# Patient Record
Sex: FEMALE | Race: WHITE | NOT HISPANIC OR LATINO | Employment: STUDENT | URBAN - METROPOLITAN AREA
[De-identification: names, ages, dates, MRNs, and addresses within clinical notes are randomized per-mention and may not be internally consistent; named-entity substitution may affect disease eponyms.]

---
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
## 2019-04-03 ENCOUNTER — HOSPITAL ENCOUNTER (EMERGENCY)
Facility: HOSPITAL | Age: 19
Discharge: HOME | End: 2019-04-03
Attending: SURGERY
Payer: COMMERCIAL

## 2019-04-03 VITALS
HEART RATE: 84 BPM | TEMPERATURE: 96 F | OXYGEN SATURATION: 100 % | DIASTOLIC BLOOD PRESSURE: 80 MMHG | SYSTOLIC BLOOD PRESSURE: 133 MMHG | RESPIRATION RATE: 18 BRPM

## 2019-04-03 DIAGNOSIS — M25.571 RIGHT ANKLE PAIN: ICD-10-CM

## 2019-04-03 DIAGNOSIS — S82.891A AVULSION FRACTURE OF ANKLE, RIGHT, CLOSED, INITIAL ENCOUNTER: Primary | ICD-10-CM

## 2019-04-03 DIAGNOSIS — M79.671 RIGHT FOOT PAIN: ICD-10-CM

## 2019-04-03 LAB — B-HCG UR QL: NEGATIVE

## 2019-04-03 PROCEDURE — 25000003 PHARM REV CODE 250: Performed by: NURSE PRACTITIONER

## 2019-04-03 PROCEDURE — 81025 URINE PREGNANCY TEST: CPT

## 2019-04-03 PROCEDURE — 99284 EMERGENCY DEPT VISIT MOD MDM: CPT | Mod: 25

## 2019-04-03 PROCEDURE — 63600175 PHARM REV CODE 636 W HCPCS: Performed by: NURSE PRACTITIONER

## 2019-04-03 PROCEDURE — 29515 APPLICATION SHORT LEG SPLINT: CPT | Mod: RT

## 2019-04-03 PROCEDURE — 96372 THER/PROPH/DIAG INJ SC/IM: CPT | Mod: 59

## 2019-04-03 RX ORDER — TRAMADOL HYDROCHLORIDE 50 MG/1
50 TABLET ORAL EVERY 6 HOURS PRN
Qty: 15 TABLET | Refills: 0 | Status: SHIPPED | OUTPATIENT
Start: 2019-04-03 | End: 2019-05-29

## 2019-04-03 RX ORDER — TRAMADOL HYDROCHLORIDE 50 MG/1
50 TABLET ORAL
Status: COMPLETED | OUTPATIENT
Start: 2019-04-03 | End: 2019-04-03

## 2019-04-03 RX ORDER — IBUPROFEN 800 MG/1
800 TABLET ORAL EVERY 8 HOURS PRN
Qty: 20 TABLET | Refills: 0 | Status: SHIPPED | OUTPATIENT
Start: 2019-04-03 | End: 2019-05-29

## 2019-04-03 RX ORDER — KETOROLAC TROMETHAMINE 30 MG/ML
60 INJECTION, SOLUTION INTRAMUSCULAR; INTRAVENOUS
Status: COMPLETED | OUTPATIENT
Start: 2019-04-03 | End: 2019-04-03

## 2019-04-03 RX ADMIN — TRAMADOL HYDROCHLORIDE 50 MG: 50 TABLET, FILM COATED ORAL at 04:04

## 2019-04-03 RX ADMIN — KETOROLAC TROMETHAMINE 60 MG: 60 INJECTION, SOLUTION INTRAMUSCULAR at 04:04

## 2019-04-03 NOTE — ED PROVIDER NOTES
Encounter Date: 4/3/2019       History     Chief Complaint   Patient presents with    Ankle Pain     Bill Jacobs is a 19 y.o. Female with PMH of Asthma and PCOS who presents to the ED with reports of right ankle pain s/p twisting while walking. Reports aching, throbbing pain that increases with movement rated 8/10 on pain scale. Denies numbness or tingling to right foot or toes. Reports taking ibuprofen for pain PTA.     The history is provided by the patient.     Review of patient's allergies indicates:  No Known Allergies  Past Medical History:   Diagnosis Date    Asthma     History of PCOS      Past Surgical History:   Procedure Laterality Date    HYMENECTOMY (PROCEDURE WAS NOT DONE) N/A 7/20/2018    Per[facility] at [facility]    TONSILLECTOMY       Family History   Problem Relation Age of Onset    Endometriosis Sister     Breast cancer Neg Hx     Colon cancer Neg Hx     Ovarian cancer Neg Hx      Social History     Tobacco Use    Smoking status: Never Smoker    Smokeless tobacco: Never Used   Substance Use Topics    Alcohol use: No    Drug use: No     Review of Systems   Constitutional: Negative.  Negative for activity change, chills and fever.   HENT: Negative.  Negative for congestion, ear discharge, ear pain, postnasal drip, sinus pressure, sinus pain and sore throat.    Eyes: Negative.    Respiratory: Negative.  Negative for cough, chest tightness and shortness of breath.    Cardiovascular: Negative.  Negative for chest pain.   Gastrointestinal: Negative.  Negative for abdominal distention, abdominal pain and nausea.   Endocrine: Negative.    Genitourinary: Negative.  Negative for dysuria, frequency and urgency.   Musculoskeletal: Positive for arthralgias. Negative for back pain.        Right ankle pain status post fall with swelling. Denies numbness or tingling to right foot.   Skin: Negative.  Negative for rash.   Allergic/Immunologic: Negative.    Neurological:  Negative.  Negative for dizziness, weakness, light-headedness and numbness.   Hematological: Negative.  Does not bruise/bleed easily.   Psychiatric/Behavioral: Negative.        Physical Exam     Initial Vitals [04/03/19 1542]   BP Pulse Resp Temp SpO2   136/81 88 18 96.4 °F (35.8 °C) 100 %      MAP       --         Physical Exam    Nursing note and vitals reviewed.  Constitutional: She appears well-developed and well-nourished.   HENT:   Head: Normocephalic and atraumatic.   Right Ear: External ear normal.   Left Ear: External ear normal.   Nose: Nose normal.   Mouth/Throat: Oropharynx is clear and moist.   Eyes: Conjunctivae and EOM are normal. Pupils are equal, round, and reactive to light.   Neck: Normal range of motion. Neck supple.   Cardiovascular: Normal rate, regular rhythm, normal heart sounds and intact distal pulses.   Pulmonary/Chest: Effort normal and breath sounds normal. She has no decreased breath sounds. She has no wheezes. She has no rhonchi. She has no rales.   Abdominal: Soft. Bowel sounds are normal. There is no tenderness.   Musculoskeletal:        Right ankle: She exhibits decreased range of motion and swelling. She exhibits no ecchymosis, no deformity, no laceration and normal pulse. Tenderness. Lateral malleolus and medial malleolus tenderness found. Achilles tendon normal.   Neurological: She is alert and oriented to person, place, and time. She has normal strength. She displays normal reflexes. No cranial nerve deficit or sensory deficit.   Skin: Skin is warm and dry. Capillary refill takes less than 2 seconds. No rash noted.   Psychiatric: She has a normal mood and affect. Her behavior is normal. Judgment and thought content normal.         ED Course   Splint Application  Date/Time: 4/3/2019 6:52 PM  Performed by: Teresa Ellis NP  Authorized by: Tom Kingston Jr., MD   Consent Done: Not Needed  Location details: right ankle  Splint type: short leg  Supplies used: plaster,  cotton  padding and elastic bandage  Post-procedure: The splinted body part was neurovascularly unchanged following the procedure.  Patient tolerance: Patient tolerated the procedure well with no immediate complications        Labs Reviewed   PREGNANCY TEST, URINE RAPID          Imaging Results          X-Ray Foot Complete Right (Final result)  Result time 04/03/19 16:08:36    Final result by Andrea Pimentel MD (04/03/19 16:08:36)                 Impression:      No acute fracture or dislocation.      Electronically signed by: Andrea Pimentel MD  Date:    04/03/2019  Time:    16:08             Narrative:    EXAMINATION:  XR FOOT COMPLETE 3 VIEW RIGHT    CLINICAL HISTORY:  XR FOOT COMPLETE 3 VIEW RIGHTPain in right foot    COMPARISON:  None    FINDINGS:  Three views of the right foot were obtained.    No evidence of acute fracture or dislocation.  Bony mineralization is normal.   Soft tissue swelling is seen about the ankle.  Small joint effusion suspected.                               X-Ray Ankle Complete Right (Final result)  Result time 04/03/19 16:07:56    Final result by Andrea Pimentel MD (04/03/19 16:07:56)                 Impression:      Soft tissue swelling is seen about the ankle. Small avulsion injury is suspected at the medial malleolus. Small joint effusion suspected.      Electronically signed by: Andrea Pimentel MD  Date:    04/03/2019  Time:    16:07             Narrative:    EXAMINATION:  XR ANKLE COMPLETE 3 VIEW RIGHT    CLINICAL HISTORY:  XR ANKLE COMPLETE 3 VIEW RIGHTPain in right ankle and joints of right foot    COMPARISON:  None    FINDINGS:  Three views of the right ankle were obtained.    No evidence of acute fracture or dislocation.  Bony mineralization is normal.    Soft tissue swelling is seen about the ankle.  Small avulsion injury is suspected at the medial malleolus.  Small joint effusion suspected.  Ankle mortise is intact.                                  Medications   ketorolac injection 60  mg (60 mg Intramuscular Given 4/3/19 1646)   traMADol tablet 50 mg (50 mg Oral Given 4/3/19 1647)                          Clinical Impression:       ICD-10-CM ICD-9-CM   1. Avulsion fracture of ankle, right, closed, initial encounter S82.891A 824.8   2. Right ankle pain M25.571 719.47   3. Right foot pain M79.671 729.5         Disposition:   Disposition: Discharged  Condition: Stable    Discharge Medication List as of 4/3/2019  5:34 PM      START taking these medications    Details   ibuprofen (ADVIL,MOTRIN) 800 MG tablet Take 1 tablet (800 mg total) by mouth every 8 (eight) hours as needed., Starting Wed 4/3/2019, Normal      traMADol (ULTRAM) 50 mg tablet Take 1 tablet (50 mg total) by mouth every 6 (six) hours as needed for Pain., Starting Wed 4/3/2019, Print            The patient acknowledges that close follow up with medical provider is required. Instructed to follow up with PCP within 2 days. Patient was given specific return precautions. The patient agrees to comply with all instruction and directions given in the ER.     Rest: Take it easy; reduce regular exercise or activities of daily living as needed but do  not eliminate them  Limit weight bearing; immobilize with crutches for partial weight bearing    ICE: Use cold in the acute phase of injury (first 72 hours) to reduce pain and swelling  Use cold in the form of ice in a plastic bag or even use frozen peas in a bag.  Apply cold four to eight times a day for 20 minutes at a time; with 45-60 minutes between applications,    Compression: Use elastic bandages, dry or wet, or open basket weave tape.    Elevation:  Elevate the joint above the level of heart to reduce swelling  Apply cool compresses well the joint is elevated.                 Teresa Ellis NP  04/03/19 [id number]

## 2019-04-03 NOTE — DISCHARGE INSTRUCTIONS
**Follow up with PCP in 24-48 hours. Return to ER with worsening of symptoms.     **Over the counter tylenol or motrin as needed for pain and/or fever as directed on package insert. Drink plenty fluids. Get plenty rest. Wash hands frequently.     **Our goal in the emergency department is to always give you outstanding care and exceptional service. You may receive a survey by mail or e-mail in the next week regarding your experience in our ED. We would greatly appreciate your completing and returning the survey. Your feedback provides us with a way to recognize our staff who give very good care and it helps us learn how to improve when your experience was below our aspiration of excellence.      Rest: Take it easy; reduce regular exercise or activities of daily living as needed but do  not eliminate them  Limit weight bearing; immobilize with crutches for partial weight bearing    ICE: Use cold in the acute phase of injury (first 72 hours) to reduce pain and swelling  Use cold in the form of ice in a plastic bag or even use frozen peas in a bag.  Apply cold four to eight times a day for 20 minutes at a time; with 45-60 minutes between applications,    Compression: Use elastic bandages, dry or wet, or open basket weave tape.    Elevation:  Elevate the joint above the level of heart to reduce swelling  Apply cool compresses well the joint is elevated.

## 2019-05-29 ENCOUNTER — OFFICE VISIT (OUTPATIENT)
Dept: OBSTETRICS AND GYNECOLOGY | Facility: CLINIC | Age: 19
End: 2019-05-29
Payer: COMMERCIAL

## 2019-05-29 VITALS
SYSTOLIC BLOOD PRESSURE: 126 MMHG | HEIGHT: 70 IN | DIASTOLIC BLOOD PRESSURE: 84 MMHG | HEART RATE: 78 BPM | RESPIRATION RATE: 13 BRPM | WEIGHT: 272.38 LBS | BODY MASS INDEX: 38.99 KG/M2

## 2019-05-29 DIAGNOSIS — N92.6 IRREGULAR MENSES: ICD-10-CM

## 2019-05-29 DIAGNOSIS — Z01.419 ENCOUNTER FOR GYNECOLOGICAL EXAMINATION (GENERAL) (ROUTINE) WITHOUT ABNORMAL FINDINGS: Primary | ICD-10-CM

## 2019-05-29 DIAGNOSIS — Q52.3 IMPERFORATE HYMEN: ICD-10-CM

## 2019-05-29 PROCEDURE — 99395 PR PREVENTIVE VISIT,EST,18-39: ICD-10-PCS | Mod: S$GLB,,, | Performed by: OBSTETRICS & GYNECOLOGY

## 2019-05-29 PROCEDURE — 99999 PR PBB SHADOW E&M-EST. PATIENT-LVL III: CPT | Mod: PBBFAC,,, | Performed by: OBSTETRICS & GYNECOLOGY

## 2019-05-29 PROCEDURE — 99395 PREV VISIT EST AGE 18-39: CPT | Mod: S$GLB,,, | Performed by: OBSTETRICS & GYNECOLOGY

## 2019-05-29 PROCEDURE — 99999 PR PBB SHADOW E&M-EST. PATIENT-LVL III: ICD-10-PCS | Mod: PBBFAC,,, | Performed by: OBSTETRICS & GYNECOLOGY

## 2019-05-29 NOTE — PROGRESS NOTES
Subjective:    Patient ID: Bill Jacobs is a 19 y.o. y.o. female.     Chief Complaint: Annual Well Woman Exam     History of Present Illness:  Bill presents today for Annual Well Woman exam. She describes her menses as irregular, but patient has trouble remembering to take pills.She denies pelvic pain.  She denies breast tenderness, masses, nipple discharge. She denies difficulty with urination or bowel movements. She denies bloating, early satiety, or weight changes. She has never been sexually active. Contraception is by oral contraceptives (estrogen/progesterone).  She has an imperforate hymen (with microperforation) but was unable to proceed with surgery secondary to cost.       Menstrual History:   Patient's last menstrual period was 2019..     OB History    :  0  Para:  0  Term:  0  :  0  :  0  Livin  Spontaneous :  0  Therapeutic :  0  Ectopic:  0  Multiple:  0  Live Births:  0            The following portions of the patient's history were reviewed and updated as appropriate: allergies, current medications, past family history, past medical history, past social history, past surgical history and problem list.    ROS:   CONSTITUTIONAL: Negative for fever, chills, diaphoresis, weakness, fatigue, weight loss, weight gain  ENT: negative for sore throat, nasal congestion, nasal discharge, epistaxis, tinnitus, hearing loss  EYES: negative for blurry vision, decreased vision, loss of vision, eye pain, diplopia, photophobia, discharge  SKIN: Negative for rash, itching, hives  RESPIRATORY: negative for cough, hemoptysis, shortness of breath, pleuritic chest pain, wheezing  CARDIOVASCULAR: negative for chest pain, dyspnea on exertion, orthopnea, paroxysmal nocturnal dyspnea, edema, palpitations  BREAST: negative for breast  tenderness, breast mass, nipple discharge, or skin changes  GASTROINTESTINAL: negative for abdominal pain, flank pain, nausea, vomiting,  diarrhea, constipation, black stool, blood in stool  GENITOURINARY: irregular menses, heavy menses, Denies: dysuria, frequency/urgency, hematuria, genital discharge, pelvic pain  HEMATOLOGIC/LYMPHATIC: negative for swollen lymph nodes, bleeding, bruising  MUSCULOSKELETAL: negative for back pain, joint pain, joint stiffness, joint swelling, muscle pain, muscle weakness  NEUROLOGICAL: negative for dizzy/vertigo, headache, focal weakness, numbness/tingling, speech problems, loss of consciousness, confusion, memory loss  BEHAVORIAL/PSYCH: negative for anxiety, depression, psychosis  ENDOCRINE: negative for polydipsia/polyuria, palpitations, skin changes, temperature intolerance, unexpected weight changes  ALLERGIC/IMMUNOLOGIC: negative for urticaria, hay fever, angioedema      Objective:    Vital Signs:  Vitals:    05/29/19 1513   BP: 126/84   Pulse: 78   Resp: 13       Physical Exam:  General:  alert, cooperative, no distress   Skin:  Skin color, texture, turgor normal. No rashes or lesions   HEENT:  extra ocular movement intact, sclera clear, anicteric   Neck: supple, trachea midline, no adenopathy or thyromegally   Respiratory:  Normal effort   Breasts:  no discharge, erythema, tenderness, or palpable masses; no axillary lymphadenopathy   Abdomen:  soft, nontender, no palpable masses   Pelvis: deferred   Extremities: Normal ROM; no edema, no cyanosis   Neurologial: Normal strength and tone. No focal numbness or weakness.   Psychiatric: normal mood, speech, dress, and thought processes         Assessment:       Healthy female exam.     1. Encounter for gynecological examination (general) (routine) without abnormal findings    2. Irregular menses    3. Imperforate hymen          Plan:      Encounter for gynecological examination (general) (routine) without abnormal findings    Irregular menses  -     norethindrone-e.estradiol-iron (JUNEL FE 24) 1 mg-20 mcg (24)/75 mg (4) Oral per tablet; Take 1 tablet by mouth once  daily.  Dispense: 28 tablet; Refill: 11    Soha lucas      Discussed possible in-office hymenectomy.      COUNSELING:  Bill was counseled on use and side-effects of various contraceptive measures, A.C.O.G. Pap guidelines and recommendations for yearly pelvic exams in addition to recommendations for monthly self breast exams; to see her PCP for other health maintenance.

## 2019-05-31 ENCOUNTER — TELEPHONE (OUTPATIENT)
Dept: OBSTETRICS AND GYNECOLOGY | Facility: CLINIC | Age: 19
End: 2019-05-31

## 2019-05-31 NOTE — TELEPHONE ENCOUNTER
Contacted [employer] [place], spoke with Rylan.  Hymenectomy cpt code 73039 does not require prior authorization.  Pt has the following coverage:  Eff:  1/1/2010 to current  DED:  $5500.00 with $1144.49 met  OOP:  $6850.00 with $871.90 met  Coverage:  75% of allowable charges  HRA account with zero balance.  Cost of Hymenectomy discussed with pt and mother- $351.07 per [facility] cost [facility].  Pt and mother agree to Hymenectomy on 7/24/2019 @ 4:30.    Reference number [id number]

## 2019-07-22 ENCOUNTER — TELEPHONE (OUTPATIENT)
Dept: OBSTETRICS AND GYNECOLOGY | Facility: CLINIC | Age: 19
End: 2019-07-22

## 2019-07-22 DIAGNOSIS — F41.9 ANXIETY DUE TO INVASIVE PROCEDURE: Primary | ICD-10-CM

## 2019-07-22 RX ORDER — DIAZEPAM 5 MG/1
TABLET ORAL
Qty: 2 TABLET | Refills: 0 | Status: SHIPPED | OUTPATIENT
Start: 2019-07-22 | End: 2019-08-06

## 2019-07-22 NOTE — TELEPHONE ENCOUNTER
----- Message from Yaa Canales sent at 7/22/2019 11:59 AM CDT -----  Contact: Self      ----- Message -----  From: Karin Arriaga  Sent: 7/22/2019  11:55 AM  To: Cody FRIEDMAN Staff    Bill Jacobs  MRN: [id number]  Home Phone      [phone]  Work Phone      Not on file.  Mobile          [phone]    Patient Care Team:  Pancho Cano MD as PCP - General (Family Medicine)  Alexia Ross MD as Consulting Physician (Obstetrics and Gynecology)  OB? No  What phone number can you be reached at? [phone]  Message:   Pt has questions regarding her up coming procedure. Please return call.

## 2019-07-22 NOTE — TELEPHONE ENCOUNTER
Pt states was told she could have medication to take prior to coming in for procedure for her anxiety. Pt uses [facility]. Please advise.

## 2019-07-22 NOTE — TELEPHONE ENCOUNTER
eRx sent to pharmacy. She can take one pill when she arrives to clinic in the waiting area.  Save the second pill to be available if needed.

## 2019-07-24 ENCOUNTER — PROCEDURE VISIT (OUTPATIENT)
Dept: OBSTETRICS AND GYNECOLOGY | Facility: CLINIC | Age: 19
End: 2019-07-24
Payer: COMMERCIAL

## 2019-07-24 VITALS
WEIGHT: 269.63 LBS | SYSTOLIC BLOOD PRESSURE: 136 MMHG | BODY MASS INDEX: 38.6 KG/M2 | RESPIRATION RATE: 14 BRPM | HEIGHT: 70 IN | DIASTOLIC BLOOD PRESSURE: 84 MMHG | HEART RATE: 96 BPM

## 2019-07-24 DIAGNOSIS — Q52.3 IMPERFORATE HYMEN: Primary | ICD-10-CM

## 2019-07-24 PROCEDURE — 56442 HYMENOTOMY: CPT | Mod: S$GLB,,, | Performed by: OBSTETRICS & GYNECOLOGY

## 2019-07-24 PROCEDURE — 56442 PR HYMENOTOMY, SIMPLE INCISION: ICD-10-PCS | Mod: S$GLB,,, | Performed by: OBSTETRICS & GYNECOLOGY

## 2019-07-24 NOTE — PROCEDURES
Biopsy (Gynecological)  Date/Time: 7/24/2019 5:44 PM  Performed by: Alexia Ross MD  Authorized by: Alexia Ross MD     Consent Done?:  Yes (Written)  Local anesthesia used?: Yes    Anesthesia:  Local infiltration  Local anesthetic:  Lidocaine 1% with epinephrine  Anesthetic total (ml):  10     Patient was placed in the lithotomy position.    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  A microperforation was present at 12 oclock approximately 7mm below the urethral meatus.    The hymen was marked for excision.  The area was prepped with betadine.  Anesthesia was achieved with 10 cc Lidocaine with epinephrine and found to be adequate.    A sterile scissors was used to cut and open the hymen from the microperforation toward the posterior.  This was then extended out in laterally a chevron pattern bilaterally.  Three triangular portions of hymen were removed circumferentially to remove the entire imperforate hymen.  The vagina was inspected and was normal.  Two finger breadths were inserted into the vagina without difficulty.  The vaginal mucosa was then re approximated in a running locked fashion with 2-0 Vicryl Rapide. Great hemostasis achieved.  The vagina was then copiously irrigated with sterile saline.  Patient tolerated the procedure well without any event.

## 2019-08-06 ENCOUNTER — OFFICE VISIT (OUTPATIENT)
Dept: OBSTETRICS AND GYNECOLOGY | Facility: CLINIC | Age: 19
End: 2019-08-06
Payer: COMMERCIAL

## 2019-08-06 VITALS
BODY MASS INDEX: 38.77 KG/M2 | WEIGHT: 270.81 LBS | HEIGHT: 70 IN | SYSTOLIC BLOOD PRESSURE: 124 MMHG | RESPIRATION RATE: 13 BRPM | DIASTOLIC BLOOD PRESSURE: 82 MMHG | HEART RATE: 77 BPM

## 2019-08-06 DIAGNOSIS — Q52.3 IMPERFORATE HYMEN: ICD-10-CM

## 2019-08-06 DIAGNOSIS — Z09 POSTOPERATIVE EXAMINATION: Primary | ICD-10-CM

## 2019-08-06 PROCEDURE — 99999 PR PBB SHADOW E&M-EST. PATIENT-LVL III: CPT | Mod: PBBFAC,,, | Performed by: OBSTETRICS & GYNECOLOGY

## 2019-08-06 PROCEDURE — 99999 PR PBB SHADOW E&M-EST. PATIENT-LVL III: ICD-10-PCS | Mod: PBBFAC,,, | Performed by: OBSTETRICS & GYNECOLOGY

## 2019-08-06 PROCEDURE — 99024 POSTOP FOLLOW-UP VISIT: CPT | Mod: S$GLB,,, | Performed by: OBSTETRICS & GYNECOLOGY

## 2019-08-06 PROCEDURE — 99024 PR POST-OP FOLLOW-UP VISIT: ICD-10-PCS | Mod: S$GLB,,, | Performed by: OBSTETRICS & GYNECOLOGY

## 2019-08-06 NOTE — PROGRESS NOTES
"Obstetrics and Gynecology  Post-operative Progress Note    Chief Complaint   Patient presents with    Post-op Evaluation       Bill Jacobs is a 19 y.o. female  post-op from an in-office hymenectomy on 19.  Patient is doing well postoperatively. No complaints today.       Past Medical History:   Diagnosis Date    Asthma     History of PCOS      Past Surgical History:   Procedure Laterality Date    HYMENECTOMY (PROCEDURE WAS NOT DONE) N/A 2018    Performed by Alexia Ross MD at [facility]    TONSILLECTOMY       Family History   Problem Relation Age of Onset    Endometriosis Sister     Breast cancer Neg Hx     Colon cancer Neg Hx     Ovarian cancer Neg Hx      Social History     Tobacco Use    Smoking status: Never Smoker    Smokeless tobacco: Never Used   Substance Use Topics    Alcohol use: No    Drug use: No     OB History    Para Term  AB Living   0 0 0 0 0 0   SAB TAB Ectopic Multiple Live Births   0 0 0 0 0       Blood Pressure 124/82   Pulse 77   Respiration 13   Height 5' 10" (1.778 m)   Weight 122.8 kg (270 lb 12.8 oz)   Body Mass Index 38.86 kg/m²     ROS:  GENERAL: No fever, chills, fatigability or weight loss.  VULVAR: No pain, no lesions and no itching.  VAGINAL: No relaxation, no itching, no discharge, no abnormal bleeding and no lesions.  ABDOMEN: No abdominal pain. Denies nausea. Denies vomiting. No diarrhea. No constipation  BREAST: Denies pain. No lumps. No discharge.  URINARY: No incontinence, no nocturia, no frequency and no dysuria.  CARDIOVASCULAR: No chest pain. No shortness of breath. No leg cramps.  NEUROLOGICAL: No headaches. No vision changes.    PE:   General appearance: alert, appears stated age, cooperative and no distress  Head: Normocephalic, without obvious abnormality, atraumatic  Pelvic: external genitalia normal, no bladder tenderness, rectovaginal septum normal, vagina normal without discharge and surgical site well " healed  Extremities: extremities normal, atraumatic, no cyanosis or edema  Skin: Skin color, texture, turgor normal. No rashes or lesions  Neurologic: Grossly normal      ASSESSMENT:    1. Postoperative examination     2. Imperforate hymen          PLAN:  Well healed, no restrictions  Follow up for annual exams or as needed.

## 2020-04-02 DIAGNOSIS — N92.6 IRREGULAR MENSES: ICD-10-CM

## 2020-04-02 NOTE — TELEPHONE ENCOUNTER
----- Message from Sheila Springer sent at 4/2/2020  2:56 PM CDT -----  Contact: Self  Bill Jacobs  MRN: [id number]  Home Phone      [phone]  Work Phone      Not on file.  Mobile          [phone]    Patient Care Team:  Pancho Cano MD as PCP - General (Family Medicine)  Alexia Ross MD as Consulting Physician (Obstetrics and Gynecology)  OB? No  What phone number can you be reached at? [phone]  Message:   Refill of birth control to [facility] in [place]. Annual scheduled 6/16/20

## 2020-04-02 NOTE — TELEPHONE ENCOUNTER
Alejandrorenetta desire refill of OCP. Annual scheduled 6-2020.   There is no problem list on file for this patient.    Prior to Admission medications    Medication Sig Start Date End Date Taking? Authorizing Provider   norethindrone-e.estradiol-iron (JUNEL FE 24) 1 mg-20 mcg (24)/75 mg (4) Oral per tablet Take 1 tablet by mouth once daily. 5/29/19 5/28/20  Alexia Ross MD

## 2020-06-16 ENCOUNTER — OFFICE VISIT (OUTPATIENT)
Dept: OBSTETRICS AND GYNECOLOGY | Facility: CLINIC | Age: 20
End: 2020-06-16
Payer: COMMERCIAL

## 2020-06-16 VITALS
RESPIRATION RATE: 13 BRPM | SYSTOLIC BLOOD PRESSURE: 126 MMHG | WEIGHT: 285.63 LBS | DIASTOLIC BLOOD PRESSURE: 72 MMHG | HEIGHT: 70 IN | HEART RATE: 53 BPM | BODY MASS INDEX: 40.89 KG/M2

## 2020-06-16 DIAGNOSIS — Z12.4 CERVICAL CANCER SCREENING: ICD-10-CM

## 2020-06-16 DIAGNOSIS — Z01.419 WELL WOMAN EXAM WITH ROUTINE GYNECOLOGICAL EXAM: Primary | ICD-10-CM

## 2020-06-16 DIAGNOSIS — N92.6 IRREGULAR MENSES: ICD-10-CM

## 2020-06-16 PROCEDURE — 99999 PR PBB SHADOW E&M-EST. PATIENT-LVL III: ICD-10-PCS | Mod: PBBFAC,,, | Performed by: OBSTETRICS & GYNECOLOGY

## 2020-06-16 PROCEDURE — 88175 CYTOPATH C/V AUTO FLUID REDO: CPT

## 2020-06-16 PROCEDURE — 99999 PR PBB SHADOW E&M-EST. PATIENT-LVL III: CPT | Mod: PBBFAC,,, | Performed by: OBSTETRICS & GYNECOLOGY

## 2020-06-16 PROCEDURE — 99395 PR PREVENTIVE VISIT,EST,18-39: ICD-10-PCS | Mod: S$GLB,,, | Performed by: OBSTETRICS & GYNECOLOGY

## 2020-06-16 PROCEDURE — 99395 PREV VISIT EST AGE 18-39: CPT | Mod: S$GLB,,, | Performed by: OBSTETRICS & GYNECOLOGY

## 2020-06-16 RX ORDER — NORETHINDRONE ACETATE AND ETHINYL ESTRADIOL AND FERROUS FUMARATE 1MG-20(24)
1 KIT ORAL DAILY
Qty: 28 TABLET | Refills: 12 | Status: SHIPPED | OUTPATIENT
Start: 2020-06-16 | End: 2021-07-12 | Stop reason: SDUPTHER

## 2020-06-16 RX ORDER — ATORVASTATIN CALCIUM 10 MG/1
TABLET, FILM COATED ORAL
COMMUNITY
Start: 2020-05-11 | End: 2023-05-22

## 2020-06-16 RX ORDER — CITALOPRAM 20 MG/1
TABLET, FILM COATED ORAL
COMMUNITY
Start: 2020-06-15 | End: 2023-05-22

## 2020-06-16 NOTE — PROGRESS NOTES
Subjective:    Patient ID: Bill Jacobs is a 20 y.o. y.o. female.     Chief Complaint: Annual Well Woman Exam     History of Present Illness:  Bill presents today for Annual Well Woman exam. She describes her menses as regular every month without intermenstrual spotting.She denies pelvic pain.  She denies breast tenderness, masses, nipple discharge. She denies difficulty with urination or bowel movements. She denies GYN lesions. She denies bloating, early satiety, or weight changes. She is sexually active. Contraception is by oral contraceptives (estrogen/progesterone).      Menstrual History:   Patient's last menstrual period was 2020..     OB History    : 0  Para: 0  Term: 0  : 0  : 0  Livin  Spontaneous : 0  Therapeutic : 0  Ectopic: 0  Multiple: 0  Live Births: 0            The following portions of the patient's history were reviewed and updated as appropriate: allergies, current medications, past family history, past medical history, past social history, past surgical history and problem list.    ROS:   CONSTITUTIONAL: Negative for fever, chills, diaphoresis, weakness, fatigue, weight loss, weight gain  ENT: negative for sore throat, nasal congestion, nasal discharge, epistaxis, tinnitus, hearing loss  EYES: negative for blurry vision, decreased vision, loss of vision, eye pain, diplopia, photophobia, discharge  SKIN: Negative for rash, itching, hives  RESPIRATORY: negative for cough, hemoptysis, shortness of breath, pleuritic chest pain, wheezing  CARDIOVASCULAR: negative for chest pain, dyspnea on exertion, orthopnea, paroxysmal nocturnal dyspnea, edema, palpitations  BREAST: negative for breast  tenderness, breast mass, nipple discharge, or skin changes  GASTROINTESTINAL: negative for abdominal pain, flank pain, nausea, vomiting, diarrhea, constipation, black stool, blood in stool  GENITOURINARY: negative for abnormal vaginal bleeding, amenorrhea,  decreased libido, dysuria, genital sores, hematuria, incontinence, menorrhagia, pelvic pain, urinary frequency, vaginal discharge  HEMATOLOGIC/LYMPHATIC: negative for swollen lymph nodes, bleeding, bruising  MUSCULOSKELETAL: negative for back pain, joint pain, joint stiffness, joint swelling, muscle pain, muscle weakness  NEUROLOGICAL: negative for dizzy/vertigo, headache, focal weakness, numbness/tingling, speech problems, loss of consciousness, confusion, memory loss  BEHAVORIAL/PSYCH: No psychosis and Positive for anxiety and depression  ENDOCRINE: negative for polydipsia/polyuria, palpitations, skin changes, temperature intolerance, unexpected weight changes  ALLERGIC/IMMUNOLOGIC: negative for urticaria, hay fever, angioedema      Objective:    Vital Signs:  Vitals:    06/16/20 0901   BP: 126/72   Pulse: (Abnormal) 53   Resp: 13       Physical Exam:  General:  alert, cooperative, no distress   Skin:  Skin color, texture, turgor normal. No rashes or lesions   HEENT:  extra ocular movement intact, sclera clear, anicteric   Neck: supple, trachea midline, no adenopathy or thyromegally   Respiratory:  Normal effort   Breasts:  no discharge, erythema, tenderness, or palpable masses; no axillary lymphadenopathy   Abdomen:  soft, nontender, no palpable masses   Pelvis: External genitalia: normal general appearance  Urinary system: urethral meatus normal, bladder nontender  Vaginal: normal mucosa without prolapse or lesions  Cervix: normal appearance  Uterus: normal size, shape, position  Adnexa: normal size, nontender bilaterally   Extremities: Normal ROM; no edema, no cyanosis   Neurologial: Normal strength and tone. No focal numbness or weakness.   Psychiatric: normal mood, speech, dress, and thought processes         Assessment:       Healthy female exam.     1. Well woman exam with routine gynecological exam    2. Cervical cancer screening    3. Irregular menses          Plan:      Well woman exam with routine  gynecological exam    Cervical cancer screening  -     Liquid-Based Pap Smear, Screening    Irregular menses  -     norethindrone-e.estradioL-iron (JUNEL FE 24) 1 mg-20 mcg (24)/75 mg (4) per tablet; Take 1 tablet by mouth once daily.  Dispense: 28 tablet; Refill: 12            COUNSELING:  Bill was counseled on STD pevention, use and side-effects of various contraceptive measures, A.C.O.G. Pap guidelines and recommendations for yearly pelvic exams in addition to recommendations for monthly self breast exams; to see her PCP for other health maintenance.

## 2020-06-23 LAB
FINAL PATHOLOGIC DIAGNOSIS: NORMAL
Lab: NORMAL

## 2021-07-12 DIAGNOSIS — N92.6 IRREGULAR MENSES: ICD-10-CM

## 2021-07-12 RX ORDER — NORETHINDRONE ACETATE AND ETHINYL ESTRADIOL AND FERROUS FUMARATE 1MG-20(24)
1 KIT ORAL DAILY
Qty: 28 TABLET | Refills: 1 | Status: SHIPPED | OUTPATIENT
Start: 2021-07-12 | End: 2023-05-22

## 2021-08-06 ENCOUNTER — OFFICE VISIT (OUTPATIENT)
Dept: OBSTETRICS AND GYNECOLOGY | Facility: CLINIC | Age: 21
End: 2021-08-06
Payer: COMMERCIAL

## 2021-08-06 VITALS
BODY MASS INDEX: 41.83 KG/M2 | HEIGHT: 70 IN | HEART RATE: 86 BPM | RESPIRATION RATE: 14 BRPM | WEIGHT: 292.19 LBS | SYSTOLIC BLOOD PRESSURE: 114 MMHG | DIASTOLIC BLOOD PRESSURE: 80 MMHG

## 2021-08-06 DIAGNOSIS — Z01.419 WELL WOMAN EXAM WITH ROUTINE GYNECOLOGICAL EXAM: Primary | ICD-10-CM

## 2021-08-06 DIAGNOSIS — Z30.014 ENCOUNTER FOR INITIAL PRESCRIPTION OF INTRAUTERINE CONTRACEPTIVE DEVICE (IUD): ICD-10-CM

## 2021-08-06 PROCEDURE — 3079F PR MOST RECENT DIASTOLIC BLOOD PRESSURE 80-89 MM HG: ICD-10-PCS | Mod: CPTII,S$GLB,, | Performed by: OBSTETRICS & GYNECOLOGY

## 2021-08-06 PROCEDURE — 1126F PR PAIN SEVERITY QUANTIFIED, NO PAIN PRESENT: ICD-10-PCS | Mod: CPTII,S$GLB,, | Performed by: OBSTETRICS & GYNECOLOGY

## 2021-08-06 PROCEDURE — 99999 PR PBB SHADOW E&M-EST. PATIENT-LVL III: ICD-10-PCS | Mod: PBBFAC,,, | Performed by: OBSTETRICS & GYNECOLOGY

## 2021-08-06 PROCEDURE — 3008F BODY MASS INDEX DOCD: CPT | Mod: CPTII,S$GLB,, | Performed by: OBSTETRICS & GYNECOLOGY

## 2021-08-06 PROCEDURE — 1160F PR REVIEW ALL MEDS BY PRESCRIBER/CLIN PHARMACIST DOCUMENTED: ICD-10-PCS | Mod: CPTII,S$GLB,, | Performed by: OBSTETRICS & GYNECOLOGY

## 2021-08-06 PROCEDURE — 1159F PR MEDICATION LIST DOCUMENTED IN MEDICAL RECORD: ICD-10-PCS | Mod: CPTII,S$GLB,, | Performed by: OBSTETRICS & GYNECOLOGY

## 2021-08-06 PROCEDURE — 1159F MED LIST DOCD IN RCRD: CPT | Mod: CPTII,S$GLB,, | Performed by: OBSTETRICS & GYNECOLOGY

## 2021-08-06 PROCEDURE — 3008F PR BODY MASS INDEX (BMI) DOCUMENTED: ICD-10-PCS | Mod: CPTII,S$GLB,, | Performed by: OBSTETRICS & GYNECOLOGY

## 2021-08-06 PROCEDURE — 99395 PR PREVENTIVE VISIT,EST,18-39: ICD-10-PCS | Mod: S$GLB,,, | Performed by: OBSTETRICS & GYNECOLOGY

## 2021-08-06 PROCEDURE — 1126F AMNT PAIN NOTED NONE PRSNT: CPT | Mod: CPTII,S$GLB,, | Performed by: OBSTETRICS & GYNECOLOGY

## 2021-08-06 PROCEDURE — 99395 PREV VISIT EST AGE 18-39: CPT | Mod: S$GLB,,, | Performed by: OBSTETRICS & GYNECOLOGY

## 2021-08-06 PROCEDURE — 99999 PR PBB SHADOW E&M-EST. PATIENT-LVL III: CPT | Mod: PBBFAC,,, | Performed by: OBSTETRICS & GYNECOLOGY

## 2021-08-06 PROCEDURE — 3079F DIAST BP 80-89 MM HG: CPT | Mod: CPTII,S$GLB,, | Performed by: OBSTETRICS & GYNECOLOGY

## 2021-08-06 PROCEDURE — 1160F RVW MEDS BY RX/DR IN RCRD: CPT | Mod: CPTII,S$GLB,, | Performed by: OBSTETRICS & GYNECOLOGY

## 2021-08-06 PROCEDURE — 3074F PR MOST RECENT SYSTOLIC BLOOD PRESSURE < 130 MM HG: ICD-10-PCS | Mod: CPTII,S$GLB,, | Performed by: OBSTETRICS & GYNECOLOGY

## 2021-08-06 PROCEDURE — 3074F SYST BP LT 130 MM HG: CPT | Mod: CPTII,S$GLB,, | Performed by: OBSTETRICS & GYNECOLOGY

## 2021-08-06 RX ORDER — IBUPROFEN 800 MG/1
TABLET ORAL
COMMUNITY
End: 2023-06-20

## 2021-08-06 RX ORDER — ALBUTEROL SULFATE 90 UG/1
AEROSOL, METERED RESPIRATORY (INHALATION)
COMMUNITY
Start: 2021-04-08

## 2021-08-09 ENCOUNTER — TELEPHONE (OUTPATIENT)
Dept: OBSTETRICS AND GYNECOLOGY | Facility: CLINIC | Age: 21
End: 2021-08-09

## 2021-08-27 ENCOUNTER — PROCEDURE VISIT (OUTPATIENT)
Dept: OBSTETRICS AND GYNECOLOGY | Facility: CLINIC | Age: 21
End: 2021-08-27
Payer: COMMERCIAL

## 2021-08-27 VITALS
SYSTOLIC BLOOD PRESSURE: 104 MMHG | HEIGHT: 70 IN | RESPIRATION RATE: 14 BRPM | WEIGHT: 290.81 LBS | DIASTOLIC BLOOD PRESSURE: 76 MMHG | HEART RATE: 100 BPM | BODY MASS INDEX: 41.63 KG/M2

## 2021-08-27 DIAGNOSIS — Z30.430 ENCOUNTER FOR IUD INSERTION: Primary | ICD-10-CM

## 2021-08-27 LAB
B-HCG UR QL: NEGATIVE
CTP QC/QA: YES

## 2021-08-27 PROCEDURE — 58300 INSERT INTRAUTERINE DEVICE: CPT | Mod: S$GLB,,, | Performed by: OBSTETRICS & GYNECOLOGY

## 2021-08-27 PROCEDURE — 99499 UNLISTED E&M SERVICE: CPT | Mod: S$GLB,,, | Performed by: OBSTETRICS & GYNECOLOGY

## 2021-08-27 PROCEDURE — 81025 URINE PREGNANCY TEST: CPT | Mod: S$GLB,,, | Performed by: OBSTETRICS & GYNECOLOGY

## 2021-08-27 PROCEDURE — 58300 INSERTION OF IUD: ICD-10-PCS | Mod: S$GLB,,, | Performed by: OBSTETRICS & GYNECOLOGY

## 2021-08-27 PROCEDURE — 81025 POCT URINE PREGNANCY: ICD-10-PCS | Mod: S$GLB,,, | Performed by: OBSTETRICS & GYNECOLOGY

## 2021-08-27 PROCEDURE — 99499 NO LOS: ICD-10-PCS | Mod: S$GLB,,, | Performed by: OBSTETRICS & GYNECOLOGY

## 2021-11-15 ENCOUNTER — TELEPHONE (OUTPATIENT)
Dept: OBSTETRICS AND GYNECOLOGY | Facility: CLINIC | Age: 21
End: 2021-11-15
Payer: COMMERCIAL

## 2023-05-22 ENCOUNTER — OFFICE VISIT (OUTPATIENT)
Dept: OBSTETRICS AND GYNECOLOGY | Facility: CLINIC | Age: 23
End: 2023-05-22
Payer: COMMERCIAL

## 2023-05-22 VITALS
BODY MASS INDEX: 41.95 KG/M2 | WEIGHT: 293 LBS | SYSTOLIC BLOOD PRESSURE: 98 MMHG | HEART RATE: 87 BPM | DIASTOLIC BLOOD PRESSURE: 64 MMHG | HEIGHT: 70 IN

## 2023-05-22 DIAGNOSIS — Z01.419 WELL WOMAN EXAM WITH ROUTINE GYNECOLOGICAL EXAM: Primary | ICD-10-CM

## 2023-05-22 DIAGNOSIS — Z12.4 CERVICAL CANCER SCREENING: ICD-10-CM

## 2023-05-22 PROCEDURE — 88175 CYTOPATH C/V AUTO FLUID REDO: CPT | Performed by: PATHOLOGY

## 2023-05-22 PROCEDURE — 3008F BODY MASS INDEX DOCD: CPT | Mod: CPTII,S$GLB,, | Performed by: OBSTETRICS & GYNECOLOGY

## 2023-05-22 PROCEDURE — 1160F RVW MEDS BY RX/DR IN RCRD: CPT | Mod: CPTII,S$GLB,, | Performed by: OBSTETRICS & GYNECOLOGY

## 2023-05-22 PROCEDURE — 1160F PR REVIEW ALL MEDS BY PRESCRIBER/CLIN PHARMACIST DOCUMENTED: ICD-10-PCS | Mod: CPTII,S$GLB,, | Performed by: OBSTETRICS & GYNECOLOGY

## 2023-05-22 PROCEDURE — 99395 PREV VISIT EST AGE 18-39: CPT | Mod: S$GLB,,, | Performed by: OBSTETRICS & GYNECOLOGY

## 2023-05-22 PROCEDURE — 3078F DIAST BP <80 MM HG: CPT | Mod: CPTII,S$GLB,, | Performed by: OBSTETRICS & GYNECOLOGY

## 2023-05-22 PROCEDURE — 3008F PR BODY MASS INDEX (BMI) DOCUMENTED: ICD-10-PCS | Mod: CPTII,S$GLB,, | Performed by: OBSTETRICS & GYNECOLOGY

## 2023-05-22 PROCEDURE — 3074F PR MOST RECENT SYSTOLIC BLOOD PRESSURE < 130 MM HG: ICD-10-PCS | Mod: CPTII,S$GLB,, | Performed by: OBSTETRICS & GYNECOLOGY

## 2023-05-22 PROCEDURE — 99999 PR PBB SHADOW E&M-EST. PATIENT-LVL III: ICD-10-PCS | Mod: PBBFAC,,, | Performed by: OBSTETRICS & GYNECOLOGY

## 2023-05-22 PROCEDURE — 1159F MED LIST DOCD IN RCRD: CPT | Mod: CPTII,S$GLB,, | Performed by: OBSTETRICS & GYNECOLOGY

## 2023-05-22 PROCEDURE — 3074F SYST BP LT 130 MM HG: CPT | Mod: CPTII,S$GLB,, | Performed by: OBSTETRICS & GYNECOLOGY

## 2023-05-22 PROCEDURE — 99999 PR PBB SHADOW E&M-EST. PATIENT-LVL III: CPT | Mod: PBBFAC,,, | Performed by: OBSTETRICS & GYNECOLOGY

## 2023-05-22 PROCEDURE — 99395 PR PREVENTIVE VISIT,EST,18-39: ICD-10-PCS | Mod: S$GLB,,, | Performed by: OBSTETRICS & GYNECOLOGY

## 2023-05-22 PROCEDURE — 1159F PR MEDICATION LIST DOCUMENTED IN MEDICAL RECORD: ICD-10-PCS | Mod: CPTII,S$GLB,, | Performed by: OBSTETRICS & GYNECOLOGY

## 2023-05-22 PROCEDURE — 3078F PR MOST RECENT DIASTOLIC BLOOD PRESSURE < 80 MM HG: ICD-10-PCS | Mod: CPTII,S$GLB,, | Performed by: OBSTETRICS & GYNECOLOGY

## 2023-05-22 RX ORDER — ALBUTEROL SULFATE 0.63 MG/3ML
SOLUTION RESPIRATORY (INHALATION)
COMMUNITY

## 2023-05-22 RX ORDER — CITALOPRAM 40 MG/1
40 TABLET, FILM COATED ORAL DAILY
COMMUNITY

## 2023-05-22 NOTE — PROGRESS NOTES
Subjective:    Patient ID: Bill Jacobs is a 23 y.o. y.o. female.     Chief Complaint: Annual Well Woman Exam     History of Present Illness:  Bill presents today for Annual Well Woman exam. She describes her menses as  absent, only occ spotting with IUD .She denies pelvic pain.  She denies breast tenderness, masses, nipple discharge. She denies GYN complaints. She denies difficulty with urination or bowel movements. She denies bloating, early satiety, or weight changes. She is not currently sexually active. Contraception is by IUD.      Menstrual History:   Patient's last menstrual period was 2023..     OB History    : 0  Para: 0  Term: 0  : 0  : 0  Livin  Spontaneous : 0  Induced : 0  Ectopic: 0  Multiple: 0  Live Births: 0            The following portions of the patient's history were reviewed and updated as appropriate: allergies, current medications, past family history, past medical history, past social history, past surgical history, and problem list.    ROS:   CONSTITUTIONAL: Negative for fever, chills, diaphoresis, weakness, fatigue, weight loss, weight gain  ENT: negative for sore throat, nasal congestion, nasal discharge, epistaxis, tinnitus, hearing loss  EYES: negative for blurry vision, decreased vision, loss of vision, eye pain, diplopia, photophobia, discharge  SKIN: acne, Negative for rash, itching, hives  RESPIRATORY: negative for cough, hemoptysis, shortness of breath, pleuritic chest pain, wheezing  CARDIOVASCULAR: negative for chest pain, dyspnea on exertion, orthopnea, paroxysmal nocturnal dyspnea, edema, palpitations  BREAST: negative for breast  tenderness, breast mass, nipple discharge, or skin changes  GASTROINTESTINAL: negative for abdominal pain, flank pain, nausea, vomiting, diarrhea, constipation, black stool, blood in stool  GENITOURINARY: negative for abnormal vaginal bleeding, amenorrhea, decreased libido, dysuria, genital  sores, hematuria, incontinence, menorrhagia, pelvic pain, urinary frequency, vaginal discharge  HEMATOLOGIC/LYMPHATIC: negative for swollen lymph nodes, bleeding, bruising  MUSCULOSKELETAL: negative for back pain, joint pain, joint stiffness, joint swelling, muscle pain, muscle weakness  NEUROLOGICAL: negative for dizzy/vertigo, headache, focal weakness, numbness/tingling, speech problems, loss of consciousness, confusion, memory loss  BEHAVORIAL/PSYCH: No psychosis and Positive for anxiety and depression  ENDOCRINE: negative for polydipsia/polyuria, palpitations, skin changes, temperature intolerance, unexpected weight changes  ALLERGIC/IMMUNOLOGIC: negative for urticaria, hay fever, angioedema      Objective:    Vital Signs:  Vitals:    05/22/23 1025   BP: 98/64   Pulse: 87       Physical Exam:  General:  alert, cooperative, no distress   Skin:  Skin color, texture, turgor normal. No rashes or lesions   HEENT:  extra ocular movement intact, sclera clear, anicteric   Neck: supple, trachea midline, no adenopathy or thyromegally   Respiratory:  Normal effort   Breasts:  no discharge, erythema, tenderness, or palpable masses; no axillary lymphadenopathy   Abdomen:  soft, nontender, no palpable masses   Pelvis: External genitalia: normal general appearance  Urinary system: urethral meatus normal, bladder nontender  Vaginal: normal mucosa without prolapse or lesions  Cervix: normal appearance, IUD string visualized  Uterus: normal size, shape, position  Adnexa: normal size, nontender bilaterally   Extremities: Normal ROM; no edema, no cyanosis   Neurologial: Normal strength and tone. No focal numbness or weakness.   Psychiatric: normal mood, speech, dress, and thought processes         Assessment:       Healthy female exam.     1. Well woman exam with routine gynecological exam    2. Cervical cancer screening          Plan:      Well woman exam with routine gynecological exam    Cervical cancer screening  -      Liquid-Based Pap Smear, Screening        COUNSELING:  Bill was counseled on STD pevention, use and side-effects of various contraceptive measures, A.C.O.G. Pap guidelines and recommendations for yearly pelvic exams in addition to recommendations for monthly self breast exams; to see her PCP for other health maintenance.

## 2023-05-29 LAB
FINAL PATHOLOGIC DIAGNOSIS: ABNORMAL
Lab: ABNORMAL

## 2023-05-30 ENCOUNTER — TELEPHONE (OUTPATIENT)
Dept: OBSTETRICS AND GYNECOLOGY | Facility: CLINIC | Age: 23
End: 2023-05-30
Payer: COMMERCIAL

## 2023-05-30 NOTE — TELEPHONE ENCOUNTER
Pt was called and she desires to know more information about the colposcopy she has scheduled. Colposcopy procedure explained to pt. Instructed pt to contact clinic if she has any further questions about the procedure prior to her visit. Pt voiced understanding.

## 2023-05-30 NOTE — TELEPHONE ENCOUNTER
----- Message from Flavia Rodrigues sent at 5/30/2023  3:27 PM CDT -----  Contact: self  Bill Jacobs  MRN: [id number]  Home Phone      [phone]  Work Phone      Not on file.  Mobile          [phone]    Patient Care Team:  Pancho Cano MD as PCP - General (Family Medicine)  Alexia Ross MD as Consulting Physician (Obstetrics and Gynecology)  OB? No  What phone number can you be reached at? [phone]  Message: patient have a couple questions about upcoming procedure.

## 2023-06-13 ENCOUNTER — TELEPHONE (OUTPATIENT)
Dept: OBSTETRICS AND GYNECOLOGY | Facility: CLINIC | Age: 23
End: 2023-06-13
Payer: COMMERCIAL

## 2023-06-13 NOTE — TELEPHONE ENCOUNTER
----- Message from Laura Caro MA sent at 6/13/2023 12:49 PM CDT -----  Contact: self  Bill Jacobs  MRN: [id number]  Home Phone      [phone]  Work Phone      Not on file.  Mobile          [phone]    Patient Care Team:  Pancho Cano MD as PCP - General (Family Medicine)  Alexia Ross MD as Consulting Physician (Obstetrics and Gynecology)  OB? No  What phone number can you be reached at? [phone]  Message: Has questions regarding colpo.

## 2023-06-13 NOTE — TELEPHONE ENCOUNTER
Pt is wanting to know if she can have intercourse before her colposcopy, pt instructed to avoid intercourse at least 3 days before colpo. Pt also wanted to know if she could be prescribed Valium for the procedure like she had for her hymenectomy. Pt informed that this procedure is not invasive like the hymenectomy that we are not able to prescribe medications like that for this procedure. Pt verbally understood.

## 2023-06-20 ENCOUNTER — PROCEDURE VISIT (OUTPATIENT)
Dept: OBSTETRICS AND GYNECOLOGY | Facility: CLINIC | Age: 23
End: 2023-06-20
Payer: COMMERCIAL

## 2023-06-20 VITALS
HEART RATE: 102 BPM | HEIGHT: 70 IN | DIASTOLIC BLOOD PRESSURE: 74 MMHG | SYSTOLIC BLOOD PRESSURE: 112 MMHG | BODY MASS INDEX: 41.95 KG/M2 | WEIGHT: 293 LBS

## 2023-06-20 DIAGNOSIS — R87.613 HGSIL (HIGH GRADE SQUAMOUS INTRAEPITHELIAL LESION) ON PAP SMEAR OF CERVIX: Primary | ICD-10-CM

## 2023-06-20 DIAGNOSIS — Z01.812 PRE-PROCEDURAL LABORATORY EXAMINATION: ICD-10-CM

## 2023-06-20 LAB
B-HCG UR QL: NEGATIVE
CTP QC/QA: YES

## 2023-06-20 PROCEDURE — 88342 IMHCHEM/IMCYTCHM 1ST ANTB: CPT | Performed by: PATHOLOGY

## 2023-06-20 PROCEDURE — 81025 URINE PREGNANCY TEST: CPT | Mod: S$GLB,,, | Performed by: OBSTETRICS & GYNECOLOGY

## 2023-06-20 PROCEDURE — 88342 IMHCHEM/IMCYTCHM 1ST ANTB: CPT | Mod: 26,,, | Performed by: PATHOLOGY

## 2023-06-20 PROCEDURE — 81025 POCT URINE PREGNANCY: ICD-10-PCS | Mod: S$GLB,,, | Performed by: OBSTETRICS & GYNECOLOGY

## 2023-06-20 PROCEDURE — 88305 TISSUE EXAM BY PATHOLOGIST: ICD-10-PCS | Mod: 26,,, | Performed by: PATHOLOGY

## 2023-06-20 PROCEDURE — 88305 TISSUE EXAM BY PATHOLOGIST: CPT | Mod: 26,,, | Performed by: PATHOLOGY

## 2023-06-20 PROCEDURE — 88342 CHG IMMUNOCYTOCHEMISTRY: ICD-10-PCS | Mod: 26,,, | Performed by: PATHOLOGY

## 2023-06-20 PROCEDURE — 57454 BX/CURETT OF CERVIX W/SCOPE: CPT | Mod: S$GLB,,, | Performed by: OBSTETRICS & GYNECOLOGY

## 2023-06-20 PROCEDURE — 57454 COLPOSCOPY: ICD-10-PCS | Mod: S$GLB,,, | Performed by: OBSTETRICS & GYNECOLOGY

## 2023-06-20 PROCEDURE — 88305 TISSUE EXAM BY PATHOLOGIST: CPT | Mod: 59 | Performed by: PATHOLOGY

## 2023-06-20 NOTE — PROCEDURES
Colposcopy    Date/Time: 6/20/2023 2:15 PM  Performed by: Alexia Ross MD  Authorized by: Alexia Ross MD     Consent Done?:  Yes (Written)  Timeout:Immediately prior to procedure a time out was called to verify the correct patient, procedure, equipment, support staff and site/side marked as required    Colposcopy Site:  Cervix  Acrowhite Lesion? Yes    Atypical Vessels: No    Transformation Zone Adequate?: Yes    Biopsy?: Yes         Location:  Cervix ((2 00, 6 00 and 8 00))  ECC Performed?: Yes     Patient tolerated the procedure well with no immediate complications.   Post-operative instructions were provided for the patient.   Patient was discharged and will follow up if any complications occur

## 2023-06-27 LAB
FINAL PATHOLOGIC DIAGNOSIS: NORMAL
GROSS: NORMAL
Lab: NORMAL

## 2023-06-29 ENCOUNTER — TELEPHONE (OUTPATIENT)
Dept: OBSTETRICS AND GYNECOLOGY | Facility: CLINIC | Age: 23
End: 2023-06-29
Payer: COMMERCIAL

## 2023-06-29 NOTE — TELEPHONE ENCOUNTER
----- Message from Laura Caro MA sent at 6/29/2023 12:26 PM CDT -----  Contact: self  Bill Jacobs  MRN: [id number]  Home Phone      [phone]  Work Phone      Not on file.  Mobile          [phone]    Patient Care Team:  Pancho Cano MD as PCP - General (Family Medicine)  Alexia Ross MD as Consulting Physician (Obstetrics and Gynecology)  OB? No  What phone number can you be reached at? [phone]  Message: Would like to speak to nurse regarding abd pain since procedure last week.

## 2023-06-29 NOTE — TELEPHONE ENCOUNTER
Pt states she is having severe pelvic and back pain. States she is having fever, vomiting, and diarrhea since yesterday. Pt instructed that this is not ob/gyn related that she may have a stomach bug or some kind of GI issue going on. Pt instructed that she should contact her pcp, pt states she is not in the area that she is in [place]. Pt instructed that she should go to the nearest urgent care to get checked out and that these symptoms are not related to the colpo at all. Pt verbally understood.

## 2024-03-05 ENCOUNTER — PATIENT MESSAGE (OUTPATIENT)
Dept: OBSTETRICS AND GYNECOLOGY | Facility: CLINIC | Age: 24
End: 2024-03-05
Payer: COMMERCIAL

## 2024-03-05 DIAGNOSIS — F41.9 ANXIETY: Primary | ICD-10-CM

## 2024-03-07 ENCOUNTER — PROCEDURE VISIT (OUTPATIENT)
Dept: OBSTETRICS AND GYNECOLOGY | Facility: CLINIC | Age: 24
End: 2024-03-07
Payer: COMMERCIAL

## 2024-03-07 VITALS
WEIGHT: 293 LBS | HEIGHT: 70 IN | SYSTOLIC BLOOD PRESSURE: 120 MMHG | HEART RATE: 66 BPM | DIASTOLIC BLOOD PRESSURE: 74 MMHG | BODY MASS INDEX: 41.95 KG/M2

## 2024-03-07 DIAGNOSIS — Z01.812 PRE-PROCEDURAL LABORATORY EXAMINATION: Primary | ICD-10-CM

## 2024-03-07 DIAGNOSIS — Z87.42 HISTORY OF ABNORMAL CERVICAL PAP SMEAR: ICD-10-CM

## 2024-03-07 DIAGNOSIS — R87.613 HGSIL ON PAP SMEAR OF CERVIX: ICD-10-CM

## 2024-03-07 LAB
B-HCG UR QL: NEGATIVE
CTP QC/QA: YES

## 2024-03-07 PROCEDURE — 88141 CYTOPATH C/V INTERPRET: CPT | Mod: ,,, | Performed by: PATHOLOGY

## 2024-03-07 PROCEDURE — 81025 URINE PREGNANCY TEST: CPT | Mod: S$GLB,,, | Performed by: OBSTETRICS & GYNECOLOGY

## 2024-03-07 PROCEDURE — 57455 BIOPSY OF CERVIX W/SCOPE: CPT | Mod: S$GLB,,, | Performed by: OBSTETRICS & GYNECOLOGY

## 2024-03-07 PROCEDURE — 88305 TISSUE EXAM BY PATHOLOGIST: CPT | Mod: 26,,, | Performed by: PATHOLOGY

## 2024-03-07 PROCEDURE — 88305 TISSUE EXAM BY PATHOLOGIST: CPT | Performed by: PATHOLOGY

## 2024-03-07 PROCEDURE — 88175 CYTOPATH C/V AUTO FLUID REDO: CPT | Performed by: PATHOLOGY

## 2024-03-07 PROCEDURE — 87624 HPV HI-RISK TYP POOLED RSLT: CPT | Performed by: OBSTETRICS & GYNECOLOGY

## 2024-03-07 RX ORDER — DIAZEPAM 5 MG/1
5 TABLET ORAL ONCE
Qty: 1 TABLET | Refills: 0 | Status: SHIPPED | OUTPATIENT
Start: 2024-03-07 | End: 2024-03-07 | Stop reason: SDUPTHER

## 2024-03-07 RX ORDER — DIAZEPAM 5 MG/1
5 TABLET ORAL ONCE
Qty: 1 TABLET | Refills: 0 | Status: SHIPPED | OUTPATIENT
Start: 2024-03-07 | End: 2024-03-07

## 2024-03-07 NOTE — PROCEDURES
Colposcopy    Date/Time: 3/7/2024 4:00 PM    Performed by: Alexia Ross MD  Authorized by: Alexia Ross MD    Timeout:Immediately prior to procedure a time out was called to verify the correct patient, procedure, equipment, support staff and site/side marked as required    Colposcopy Site:  Cervix and Vagina  Acrowhite Lesion? Yes    Transformation Zone Adequate?: Yes    Biopsy?: Yes         Location:  Cervix ((5 00 and 9 00))  ECC Performed?: No    LEEP Performed?: No     Patient tolerated the procedure well with no immediate complications.   Post-operative instructions were provided for the patient.   Patient was discharged and will follow up if any complications occur     Pap performed

## 2024-03-14 LAB
FINAL PATHOLOGIC DIAGNOSIS: NORMAL
GROSS: NORMAL
Lab: NORMAL

## 2024-03-20 LAB
FINAL PATHOLOGIC DIAGNOSIS: ABNORMAL
Lab: ABNORMAL

## 2024-03-25 LAB
HPV HR 12 DNA SPEC QL NAA+PROBE: NEGATIVE
HPV16 AG SPEC QL: NEGATIVE
HPV18 DNA SPEC QL NAA+PROBE: NEGATIVE

## 2024-07-10 ENCOUNTER — PATIENT MESSAGE (OUTPATIENT)
Dept: OBSTETRICS AND GYNECOLOGY | Facility: CLINIC | Age: 24
End: 2024-07-10
Payer: COMMERCIAL